# Patient Record
(demographics unavailable — no encounter records)

---

## 2025-01-16 NOTE — REVIEW OF SYSTEMS
[de-identified] : General appearance: well nourished and hydrated, pleasant, alert and oriented x 3, cooperative.\par HEENT: Normocephalic, EOM intact, Nasal septum midline, Oral cavity clear, External auditory canal clear.\par Cardiovascular: no apparent abnormalities, no lower leg edema, no varicosities, pedal pulses are palpable.\par Lymphatics Lymph nodes: none palpated, Lymphedema: not present.\par Neurologic: sensation is normal, no muscle weakness in upper or lower extremities, patella tendon reflexes intact .\par Dermatologic no apparent skin lesions, moist, warm, no rash.\par Spine:cervical spine appears normal and moves freely, thoracic spine appears normal and moves freely, limited mobility LS spine with some mild discomfort around the parasacral muscles \par Gait: mild antalgic.\par \par Left knee\par Inspection: trace effusion and popiteal cyst noted \par Wounds: none.\par Alignment: normal.\par Palpation: no specific tenderness on palpation.\par ROM active (in degrees): 0-120 with crepitus no instability and good strength \par Ligamentous laxity: all ligaments appear stable,, negative ant. drawer test, negative post. drawer test, stable to varus stress test, stable to valgus stress test. negative Lachman's test, negative pivot shift test\par Meniscal Test: negative McMurrays, negative Rodolfo.\par Patellofemoral Alignment Test: Q angle-, normal.\par Muscle Test: good quad strength.\par Leg examination: calf is soft and non-tender.\par \par Right knee\par Inspection: trace effusion \par Wounds: none.\par Alignment: normal.\par Palpation: no specific tenderness on palpation.\par ROM active (in degrees):, .0-120 no instability and good strength \par Ligamentous laxity: all ligaments appear stable,, negative ant. drawer test, negative post. drawer test, stable to varus stress test, stable to valgus stress test. negative Lachman's test, negative pivot shift test\par Meniscal Test: negative McMurrays, negative Rodolfo.\par Patellofemoral Alignment Test: Q angle-, normal.\par Muscle Test: good quad strength.\par Leg examination: calf is soft and non-tender.\par \par Left hip\par Inspection: No swelling or ecchymosis.\par Wounds: none.\par Palpation: non-tender.\par Stability: no instability.\par Strength: 5/5 all motor groups.\par ROM: no pain with FROM.\par Leg length: equal.\par \par Right hip\par Inspection: No swelling or ecchymosis.\par Wounds: none.\par Palpation: non-tender.\par Stability: no instability.\par Strength: 5/5 all motor groups.\par ROM: no pain with FROM.\par Leg length: equal.\par \par Left ankle\par Inspection: no erythema noted, no swelling noted.\par Palpation: no pain on palpation .\par ROM: FROM without crepitus.\par Muscle strength: 5/5.\par Stability: no instability noted.\par \par Right ankle\par Inspection: no erythema noted, no swelling noted.\par ROM: FROM without crepitus.\par Palpation: no pain on palpation .\par Muscle strength: 5/5.\par Stability: no instability noted.\par \par Left foot\par Inspection: color, texture and turgor are normal. mild pes planus noted \par ROM: full range of motion of all joints without pain or crepitus.\par Palpation: no tenderness.\par Stability: no instability noted.\par \par Right foot\par Inspection: color, texture and turgor are normal. mild pes planus noted \par ROM: full range of motion of all joints without pain or crepitus.\par Palpation: no tenderness.\par Stability: no instability noted.\par \par Left shoulder\par Inspection: no muscle asymmetry, no atrophy.\par Palpation: no tenderness noted, ACJ non-tender.\par ROM: full active ROM, full passive ROM.\par Strength testing): anterior deltoid, supraspinatus, infraspinatus, subscapularis all 5/5.\par Stability test: ant. apprehension negative, post. apprehension negative, relocation test negative.\par Impingement Test: negative NEER.\par \par Right shoulder\par Inspection: no muscle asymmetry, no atrophy.\par Palpation: no tenderness noted, ACJ non-tender.\par ROM: full active ROM, full passive ROM.\par Strength testing): anterior deltoid, supraspinatus, infraspinatus, subscapularis all 5/5.\par Stability test: ant. apprehension negative, post. apprehension negative, relocation test negative.\par Impingement Test: negative NEER.\par Surgical Wounds: none.\par \par Left elbow\par Inspection: negative swelling.\par Wounds: none.\par Palpation: non-tender.\par ROM: full ROM.\par Strength: 5/5 all groups.\par Stability: no instability.\par Mass: none.\par \par Right elbow\par Inspection: negative swelling.\par Wounds: none.\par Palpation: non-tender.\par ROM: full ROM.\par Strength: 5/5 all groups.\par Stability: no instability.\par Mass: none.\par \par Left wrist\par Inspection: negative swelling.\par Wound: none.\par Palpation (bone): no tenderness.\par ROM: full ROM.\par Strength: full , good.\par \par Right wrist\par Inspection: negative swelling.\par Wound: none.\par Palpation (bone): no tenderness.\par ROM: full ROM.\par Strength: full , good.  [As Noted in HPI] : as noted in HPI [de-identified] : RIGHT knee xrays, 4 views standing AP/Lateral and Merchant films, and 45 degree PA standing view, taken at the office today shows diffuse degenerative arthritis, medial joint space narrowing, sclerosis, bone on bone, narrowing of the patellofemoral joint, Kellgren and Deric grade 4\par \par LEFT knee xrays, 4 views standing AP/Lateral and Merchant films, and 45 degree PA standing view, taken at the office today shows diffuse degenerative arthritis, medial joint space narrowing, sclerosis, bone on bone, narrowing of the patellofemoral joint, Kellgren and Deric grade 4 [Negative] : Musculoskeletal

## 2025-01-23 NOTE — PHYSICAL EXAM
[FreeTextEntry1] : GENERAL PHYSICAL EXAM: GEN: no distress, normal affect EYES: sclera white, conjunctiva clear, no nystagmus CV: normal rhythm PULM: no respiratory distress, normal rhythm and effort EXT: no edema, no cyanosis SKIN: warm, dry, no rash or lesion on exposed skin   NEUROLOGICAL EXAM: Mental Status Orientation: alert and oriented to person, place, time, and situation Language: clear and fluent, intact comprehension and repetition   Cranial Nerves II: visual fields full to confrontation III, IV, VI: PERRL, EOMI V, VII: facial sensation and movement intact and symmetric VIII: hearing intact IX, X: uvula midline, soft palate elevates normally XI: BL shoulder shrug intact XII: tongue midline   Motor Shoulder abd: 5 (R), 5 (L) EF/EE: 5 (R), 5 (L) WF/WE: 5 (R), 5 (L) hand : 5 (R), 5 (L) HF/HE: 5 (R), 5 (L) KF/KE: 5 (R), 5 (L) DF/PF: 5 (R), 5 (L) Tone and bulk are normal in upper and lower limbs No pronator drift   Sensation Intact to light touch in all 4 EXTs   Reflex 2+ in BL biceps, brachioradialis, patella   Coordination Normal FTN bilaterally Able to perform rapid, alternating movements   Gait Normal stance, stride, and pivot turn Tandem walk intact Negative Romberg

## 2025-01-23 NOTE — HISTORY OF PRESENT ILLNESS
[FreeTextEntry1] : This is a 63-year-old male presenting for hospital follow-up after ER visit on December 25th for sudden onset of chest pain, palpitations, and near-syncope. CT head showed chronic left basal ganglia infarct and chronic ischemic microvascular changes.  Patient experienced a 'flush feeling' during [place] [employer] dinner, which came and went. He initially thought it might be low blood sugar, but it persisted. CT scan in ER revealed chronic left basal ganglia infarct. Patient denies any history of stroke-like symptoms such as one-sided weakness or problems walking. He has a history of mild hypertension. Denies interval TIA, stroke-like symptoms.

## 2025-01-23 NOTE — HISTORY OF PRESENT ILLNESS
[FreeTextEntry1] : This is a 63-year-old male presenting for hospital follow-up after ER visit on December 25th for sudden onset of chest pain, palpitations, and near-syncope. CT head showed chronic left basal ganglia infarct and chronic ischemic microvascular changes.  Patient experienced a 'flush feeling' during [place] [employer] dinner, which came and went. He initially thought it might be low blood sugar, but it persisted. CT scan in ER revealed chronic left basal ganglia infarct. Patient denies any history of stroke-like symptoms such as one-sided weakness or problems walking. He has a history of mild hypertension. Denies interval TIA, stroke-like symptoms.

## 2025-01-23 NOTE — DISCUSSION/SUMMARY
[FreeTextEntry1] : This is a 63-year-old male with chronic left basal ganglia infarct, likely asymptomatic, discovered incidentally. Patient has risk factors including hypertension and obesity. Currently on appropriate medications (aspirin, statin) for secondary stroke prevention. Further imaging studies will be ordered to evaluate cerebrovascular health and identify potential risk factors. Continue current medications and emphasize risk factor management. - Order MRI and MRA of the brain and neck vessels to further evaluate the extent of vascular disease - Recommend carotid ultrasound with cardiology for ongoing monitoring - Suggest echocardiogram to evaluate for cardiac sources of emboli - Continue current medications including aspirin and atorvastatin - Emphasize importance of blood pressure control, weight management, and overall cardiovascular health - Educate patient on signs and symptoms of acute stroke - Follow-up after imaging studies to review results and adjust management plan if necessary - Consider referral to vascular neurology specialist for review of imaging if needed

## 2025-01-23 NOTE — HISTORY OF PRESENT ILLNESS
[FreeTextEntry1] : This is a 63-year-old male presenting for hospital follow-up after ER visit on December 25th for sudden onset of chest pain, palpitations, and near-syncope. CT head showed chronic left basal ganglia infarct and chronic ischemic microvascular changes.  Patient experienced a 'flush feeling' during [place] [employer] dinner, which came and went. He initially thought it might be low blood sugar, but it persisted. CT scan in ER revealed chronic left basal ganglia infarct. Patient denies any history of stroke-like symptoms such as one-sided weakness or problems walking. He has a history of mild hypertension. Denies interval TIA, stroke-like symptoms.

## 2025-03-01 NOTE — REASON FOR VISIT
[Symptom and Test Evaluation] : symptom and test evaluation [FreeTextEntry1] : 64 yo M with cruptogenic stroke referred for evaluation of  AF

## 2025-03-01 NOTE — REASON FOR VISIT
[Symptom and Test Evaluation] : symptom and test evaluation [FreeTextEntry1] : 62 yo M with cruptogenic stroke referred for evaluation of  AF

## 2025-03-03 NOTE — HISTORY OF PRESENT ILLNESS
[FreeTextEntry1] : 63M with pmhx CAD s/p PCI x1 LAD (02/2025), HTN, chronic left basal ganglia infarct presents today for eval of RBBB. He had an episode of lightheadedness/dizziness on [place] ar[place] whic prompted him to go to the ED and he was sent home that evening. He then had an outpatient MRI whiich revealed a chronic left basal ganglia infarct. Denies any hx of atrial fibrillation or syncope.   Today, states he has not had any further episodes. Since this episode in December he underwent NST which was abnormal and recently had one stent placed to LAD. Offers no acute complaints. No CP, SOB, syncope, lightheadness.  Wore a holter monitor for three weeks in Jan 2024 which revealed at most 1st deg, one brief episode of SV Tmax , PVC burden 6%, no AF   Per dc report from [facility]:  Stress echo was performed 8/16/24 revealing: normal stress echo and no evidence of ischemia or antecedent infarction. Recent Regadenoson Stress and Rest Sestamibi Perfusion Scan performed 1/22/25 revealing EF 59%; and small zone of apical ischemia.

## 2025-03-03 NOTE — DISCUSSION/SUMMARY
[EKG obtained to assist in diagnosis and management of assessed problem(s)] : EKG obtained to assist in diagnosis and management of assessed problem(s) [FreeTextEntry1] : 63M with pmhx CAD s/p PCI x1 LAD (02/2025), HTN, chronic left basal ganglia infarct presents today for eval of RBBB. ECG today NSR with noted RBBB and borderline LEONELA. Has not had syncope. Pt does have conduction disease but does not necessarily need PPM at this time. No TTE results available for review on our EMR, but per dc report EF 59% on perfusion scan in august and no arrythmias on stress echo. Will obtain records from [facility] cardiology. If he had reduced EF and wide RBBB, he may benefit from a CRT-P but that is not the case at this time. Will continue to monitor via interval Holter monitors. F/u in 6 months or sooner prn.

## 2025-03-03 NOTE — HISTORY OF PRESENT ILLNESS
[FreeTextEntry1] : 63M with pmhx CAD s/p PCI x1 LAD (02/2025), HTN, chronic left basal ganglia infarct presents today for eval of RBBB. He had an episode of lightheadedness/dizziness on [place] arlette whic prompted him to go to the ED and he was sent home that evening. He then had an outpatient MRI whiich revealed a chronic left basal ganglia infarct. Denies any hx of atrial fibrillation or syncope.   Today, states he has not had any further episodes. Since this episode in December he underwent NST which was abnormal and recently had one stent placed to LAD. Offers no acute complaints. No CP, SOB, syncope, lightheadness.  Wore a holter monitor for three weeks in Jan 2024 which revealed at most 1st deg, one brief episode of SV Tmax , PVC burden 6%, no AF   Per dc report from [facility]:  Stress echo was performed 8/16/24 revealing: normal stress echo and no evidence of ischemia or antecedent infarction. Recent Regadenoson Stress and Rest Sestamibi Perfusion Scan performed 1/22/25 revealing EF 59%; and small zone of apical ischemia.

## 2025-03-03 NOTE — DISCUSSION/SUMMARY
[EKG obtained to assist in diagnosis and management of assessed problem(s)] : EKG obtained to assist in diagnosis and management of assessed problem(s) [FreeTextEntry1] : 63M with pmhx CAD s/p PCI x1 LAD (02/2025), HTN, chronic left basal ganglia infarct presents today for eval of RBBB. ECG today NSR with noted RBBB and borderline LEONELA. Has not had syncope. Pt does have conduction disease but does not necessarily need PPM at this time. No TTE results available for review on our EMR, but per dc report EF 59% on perfusion scan in august and no arrythmias on stress echo. Will obtain records from [facility] cardiology. If he had reduced EF and wide RBBB, he may benefit from a CRT-P but that is not the case at this time. Will continue to monitor via interval Holter monitors. F/u in 6 months or sooner prn.

## 2025-03-03 NOTE — DISCUSSION/SUMMARY
[EKG obtained to assist in diagnosis and management of assessed problem(s)] : EKG obtained to assist in diagnosis and management of assessed problem(s) [FreeTextEntry1] : 63M with pmhx CAD s/p PCI x1 LAD (02/2025), HTN, chronic left basal ganglia infarct presents today for eval of RBBB. ECG today NSR with noted RBBB and borderline LEONELA. Has not had syncope. Pt does have conduction disease but does not necessarily need PPM at this time. No TTE results available for review on our EMR, but per dc report EF 59% on perfusion scan in august and no arrythmias on stress echo. Will obtain records from [facility] cardiology. If he had reduced EF and wide RBBB, he may benefit from a CRT-P but that is not the case at this time. Will continue to monitor via interval Holter monitors. F/u in 6 months or sooner prn.

## 2025-03-03 NOTE — HISTORY OF PRESENT ILLNESS
[FreeTextEntry1] : 63M with pmhx CAD s/p PCI x1 LAD (02/2025), HTN, chronic left basal ganglia infarct presents today for eval of RBBB. He had an episode of lightheadedness/dizziness on [place] ar[place] whic prompted him to go to the ED and he was sent home that evening. He then had an outpatient MRI whiich revealed a chronic left basal ganglia infarct. Denies any hx of atrial fibrillation or syncope.   Today, states he has not had any further episodes. Since this episode in December he underwent NST which was abnormal and recently had one stent placed to LAD. Offers no acute complaints. No CP, SOB, syncope, lightheadness.  Wore a holter monitor for three weeks in Jan 2024 which revealed at most 1st deg, one brief episode of SV Tmax , PVC burden 6%, no AF   Per dc report from [facility]:  Stress echo was performed 8/16/24 revealing: normal stress echo and no evidence of ischemia or antecedent infarction. Recent Regadenoson Stress and Rest Sestamibi Perfusion Scan performed 1/22/25 revealing EF 59%; and small zone of apical ischemia.

## 2025-04-01 NOTE — HEALTH RISK ASSESSMENT
[Good] : ~his/her~  mood as  good [No falls in past year] : Patient reported no falls in the past year [Little interest or pleasure doing things] : 1) Little interest or pleasure doing things [Feeling down, depressed, or hopeless] : 2) Feeling down, depressed, or hopeless [0] : 2) Feeling down, depressed, or hopeless: Not at all (0) [PHQ-2 Negative - No further assessment needed] : PHQ-2 Negative - No further assessment needed [Yes] : Reviewed medication list for presence of high-risk medications. [Never] : Never [NO] : No [Patient reported colonoscopy was normal] : Patient reported colonoscopy was normal [HIV test declined] : HIV test declined [Hepatitis C test declined] : Hepatitis C test declined [None] : None [Alone] : lives alone [Retired] : retired [] :  [Sexually Active] : sexually active [Feels Safe at Home] : Feels safe at home [Fully functional (bathing, dressing, toileting, transferring, walking, feeding)] : Fully functional (bathing, dressing, toileting, transferring, walking, feeding) [Fully functional (using the telephone, shopping, preparing meals, housekeeping, doing laundry, using] : Fully functional and needs no help or supervision to perform IADLs (using the telephone, shopping, preparing meals, housekeeping, doing laundry, using transportation, managing medications and managing finances) [Smoke Detector] : smoke detector [Carbon Monoxide Detector] : carbon monoxide detector [Safety elements used in home] : safety elements used in home [Seat Belt] :  uses seat belt [Sunscreen] : uses sunscreen [Reviewed no changes] : Reviewed, no changes [Aggressive treatment] : aggressive treatment [I will adhere to the patient's wishes.] : I will adhere to the patient's wishes. [de-identified] : social [de-identified] : not exercising [de-identified] : could be better [Change in mental status noted] : No change in mental status noted [Language] : denies difficulty with language [Reports changes in dental health] : Reports no changes in dental health [TB Exposure] : is not being exposed to tuberculosis [ColonoscopyDate] : 01/22 [AdvancecareDate] : 04/25

## 2025-04-01 NOTE — HEALTH RISK ASSESSMENT
[Good] : ~his/her~  mood as  good [No falls in past year] : Patient reported no falls in the past year [Little interest or pleasure doing things] : 1) Little interest or pleasure doing things [Feeling down, depressed, or hopeless] : 2) Feeling down, depressed, or hopeless [0] : 2) Feeling down, depressed, or hopeless: Not at all (0) [PHQ-2 Negative - No further assessment needed] : PHQ-2 Negative - No further assessment needed [Yes] : Reviewed medication list for presence of high-risk medications. [Never] : Never [NO] : No [Patient reported colonoscopy was normal] : Patient reported colonoscopy was normal [HIV test declined] : HIV test declined [Hepatitis C test declined] : Hepatitis C test declined [None] : None [Alone] : lives alone [Retired] : retired [] :  [Sexually Active] : sexually active [Feels Safe at Home] : Feels safe at home [Fully functional (bathing, dressing, toileting, transferring, walking, feeding)] : Fully functional (bathing, dressing, toileting, transferring, walking, feeding) [Fully functional (using the telephone, shopping, preparing meals, housekeeping, doing laundry, using] : Fully functional and needs no help or supervision to perform IADLs (using the telephone, shopping, preparing meals, housekeeping, doing laundry, using transportation, managing medications and managing finances) [Smoke Detector] : smoke detector [Carbon Monoxide Detector] : carbon monoxide detector [Safety elements used in home] : safety elements used in home [Seat Belt] :  uses seat belt [Sunscreen] : uses sunscreen [Reviewed no changes] : Reviewed, no changes [Aggressive treatment] : aggressive treatment [I will adhere to the patient's wishes.] : I will adhere to the patient's wishes. [de-identified] : social [de-identified] : not exercising [de-identified] : could be better [Change in mental status noted] : No change in mental status noted [Language] : denies difficulty with language [Reports changes in dental health] : Reports no changes in dental health [TB Exposure] : is not being exposed to tuberculosis [ColonoscopyDate] : 01/22 [AdvancecareDate] : 04/25

## 2025-04-01 NOTE — PHYSICAL EXAM
[Normal] : normal gait, coordination grossly intact, no focal deficits and deep tendon reflexes were 2+ and symmetric [de-identified] : obese [de-identified] : b/l knee healed surgical scars

## 2025-04-01 NOTE — PHYSICAL EXAM
[Normal] : normal gait, coordination grossly intact, no focal deficits and deep tendon reflexes were 2+ and symmetric [de-identified] : obese [de-identified] : b/l knee healed surgical scars

## 2025-04-01 NOTE — PLAN
[FreeTextEntry1] :   Annual Physical:  Counseled on dietary modification, daily regular exercise.  Increase fiber/ plant-based diet and decrease intake of meats and animal-based diet.  To routinely use seat belts. Self-testicular  and skin exams advised.  Immunizations discussed and counseled. Age-appropriate screening exams discussed. f/u after labs to discuss change of GLP class of meds

## 2025-04-28 NOTE — HISTORY OF PRESENT ILLNESS
[FreeTextEntry1] : Follow-up hyperlipidemia, hypertension, obesity [de-identified] : 62 y/o M with h/o CAD s/p PCI x1 LAD (02/2025), HTN, HLD, BPH, Morbid obesity, GERD, neuropathy, b/l knee replacement approx 10 yrs back, umbilical hernia. s/p cervical and lumbar spinal surgery - Dr Gamez. on semaglutide - compounded med - for last 4 months - lost 3 lbs - given by previous PCP - wants another medicine - GLP class  HMP: Colonoscopy: 01/2022 repeat 5 yrs as per pt Is UTD with all vaccines as per pt - will get me old records.

## 2025-04-28 NOTE — PLAN
[FreeTextEntry1] : A/P: HTN:  Stable, advised strict low salt diet, daily regular exercise, to c/w meds, bmp to be monitored closely.  CAD: Stable on aspirin/Plavix/atorvastatin, to continue with medications, follow-up cardiology as advised.  Morbid obesity: Advised on strict dietary modification, daily regular exercise, switch over to Zepbound as ordered, patient denies any history of thyroid nodules or pancreatitis, he is well aware of all side effects including of nausea and GI side effects. Follow-up in 4 weeks  Mild elevated alkaline phosphatase: Patient is asymptomatic, to check liver ultrasound and follow-up.  Chronic GERD: Advised to avoid NSAIDs, spicy foods, alcohol, fatty food, has been worsening lately, likely secondary to gastroparesis effect of GLP-1, advised to continue with PPI, trial of famotidine as ordered, will follow-up with GI.  CHELY: Stable on CPAP Follow-up 4 weeks.

## 2025-05-27 NOTE — PLAN
[FreeTextEntry1] : A/P: HTN:  Stable, advised strict low salt diet, daily regular exercise, to c/w meds, bmp to be monitored closely.  CAD: Stable on aspirin/Plavix/atorvastatin, to continue with medications, follow-up cardiology as advised.  Morbid obesity: Advised on strict dietary modification, daily regular exercise, Zepbound and Wegovy were not covered by insurance, patient is requesting to be prescribed something else, will try Mounjaro but patient is aware that this might not be covered also.  He will try strict dietary modification and daily exercise regimen.  Ultrasound abdomen discussed, hepatic steatosis,  Advised to avoid NSAIDS, alcohol and Tylenol. LFTs to be monitored closely, Daily regular exercise and to lose weight.  Follow-up in 12 weeks  Mild elevated alkaline phosphatase: Patient is asymptomatic, to check liver ultrasound and follow-up.  Chronic GERD: Advised to avoid NSAIDs, spicy foods, alcohol, fatty food, has been worsening lately, likely secondary to gastroparesis effect of GLP-1, advised to continue with PPI, trial of famotidine as ordered, will follow-up with GI.  CHELY: Stable on CPAP Follow-up 4 weeks.

## 2025-05-27 NOTE — HISTORY OF PRESENT ILLNESS
[FreeTextEntry1] : Follow-up hypertension, hyperlipidemia [de-identified] : 64 y/o M with h/o CAD s/p PCI x1 LAD (02/2025), HTN, HLD, BPH, Morbid obesity, GERD, neuropathy, b/l knee replacement approx 10 yrs back, umbilical hernia. s/p cervical and lumbar spinal surgery - Dr Gamez. Zepbound not covered by insurance, he is requesting something else to be sent.  HMP: Colonoscopy: 01/2022 repeat 5 yrs as per pt Is UTD with all vaccines as per pt - will get me old records.